# Patient Record
Sex: MALE | Race: OTHER | Employment: UNEMPLOYED | URBAN - METROPOLITAN AREA
[De-identification: names, ages, dates, MRNs, and addresses within clinical notes are randomized per-mention and may not be internally consistent; named-entity substitution may affect disease eponyms.]

---

## 2017-01-03 ENCOUNTER — OFFICE VISIT (OUTPATIENT)
Dept: BEHAVIORAL/MENTAL HEALTH CLINIC | Age: 22
End: 2017-01-03

## 2017-01-03 ENCOUNTER — TELEPHONE (OUTPATIENT)
Dept: BEHAVIORAL/MENTAL HEALTH CLINIC | Age: 22
End: 2017-01-03

## 2017-01-03 VITALS
HEART RATE: 78 BPM | OXYGEN SATURATION: 98 % | HEIGHT: 66 IN | SYSTOLIC BLOOD PRESSURE: 128 MMHG | BODY MASS INDEX: 34.39 KG/M2 | WEIGHT: 214 LBS | DIASTOLIC BLOOD PRESSURE: 82 MMHG

## 2017-01-03 DIAGNOSIS — F90.2 ATTENTION DEFICIT HYPERACTIVITY DISORDER (ADHD), COMBINED TYPE: Primary | ICD-10-CM

## 2017-01-03 DIAGNOSIS — F81.9 LEARNING DISABILITY: ICD-10-CM

## 2017-01-03 RX ORDER — LISDEXAMFETAMINE DIMESYLATE 60 MG/1
60 CAPSULE ORAL DAILY
Qty: 30 CAP | Refills: 0 | Status: SHIPPED | OUTPATIENT
Start: 2017-01-03 | End: 2017-01-03 | Stop reason: SDUPTHER

## 2017-01-03 RX ORDER — GUANFACINE 4 MG/1
4 TABLET, EXTENDED RELEASE ORAL DAILY
Qty: 30 TAB | Refills: 0 | Status: SHIPPED | OUTPATIENT
Start: 2017-01-03 | End: 2017-01-03 | Stop reason: SDUPTHER

## 2017-01-03 RX ORDER — TRAZODONE HYDROCHLORIDE 50 MG/1
TABLET ORAL
Qty: 30 TAB | Refills: 1 | Status: SHIPPED | OUTPATIENT
Start: 2017-01-03 | End: 2017-03-03 | Stop reason: SDUPTHER

## 2017-01-03 RX ORDER — LISDEXAMFETAMINE DIMESYLATE 60 MG/1
60 CAPSULE ORAL DAILY
Qty: 30 CAP | Refills: 0 | Status: SHIPPED | OUTPATIENT
Start: 2017-02-01 | End: 2017-03-03 | Stop reason: SDUPTHER

## 2017-01-03 RX ORDER — LISDEXAMFETAMINE DIMESYLATE 60 MG/1
60 CAPSULE ORAL DAILY
COMMUNITY
Start: 2016-11-23 | End: 2017-01-03 | Stop reason: SDUPTHER

## 2017-01-03 RX ORDER — GUANFACINE 4 MG/1
4 TABLET, EXTENDED RELEASE ORAL DAILY
Qty: 30 TAB | Refills: 0 | Status: SHIPPED | OUTPATIENT
Start: 2017-02-01 | End: 2017-03-03 | Stop reason: SDUPTHER

## 2017-01-03 NOTE — TELEPHONE ENCOUNTER
Spoke with pt's mother, explained that the Rx for [facility]. Informed mother that the process can take 3-5 business days and that she will be contacted when the office receives response from insurance company. No further concerns.

## 2017-01-03 NOTE — PROGRESS NOTES
Initial Psychiatric Assessment    ID: Lori Ware is a 22 y.o. yo Single  male referred by [address] [place] [address] for treatment of ADHD. Chief Complaint: \"my mouth gets out of control\"    HPI: Lori Ware is a 22 y.o. yo male who presents with symptoms of ADHD and anger. He was adopted at [address] [place] [address] after spending 5 years in the foster care system. He reports no PMH. He reports a long history of outpt psychiatric treatment for anger and ADHD starting in his childhood. He was in [facility] as a child due to his anger, and he was in a group home from [age]yo, but was kicked out after jumping up and down on the furniture. He was most recently with Dr. Adam Hoffman and therapist Hao Florence at [address] [place] [address] where he was being treated for ADHD, but he graduated out of their adolescent program and so is looking for a provider who treats adults. He and his mother, who was pulled into the session at the end for corroboration, report a history of anger and throwing and breaking items. He has never been in physical altercations. Nikita graduated high school last year, he is unemployed, and spends most of his time playing video games. His mother reports that he has broken kathy consoles in the past in order to get the latest edition. They both remark that, with therapy and the ADHD medication that he has been on since 4th grade, his anger has improved significantly. Margot Connors has a history of impulsivity, hyperactivity, being overly talkative, restlessness, distractibility, and daydreaming. He has gotten distracted at school and wondered off in the past. His mother once got a call to come and pick him up from school as he hadn't taken his medication and was jumping around on the furniture and scratching his arms until they bled. His mother reports that Margot Connors has a learning disorder and that he graduated from a special school, [email or web address], with a modified high school diploma.  He denies any recent or remote symptoms of depression or geneva such as sad moods, isolating behaviors, SI, changes to his appetite or sleep, impulsivity around gambling, spending money, or reckless driving. He denies recent or remote anxiety or panic attacks. No SI/HI. No psychosis. He is planning to enter [employer] in [place], [place] in March. He reports that this program will teach him job skills and independence. Past Psychiatric History:  Meds: Current: Vyvanse 60mg qam- helpful              Past: Concerta- hives and heart palpitations                        Geodon 60mg qhs food- helped with sleep but made him too tired and he wanted to come off                         Intuniv ER 4mg qam- helpful but he wanted to come off of his medications and so this was stopped                        Depakote- on when he was adopted and gradually got tapered off                       Risperdal- on when he was adopted and gradually got tapered off                         Melatonin- not effective   Outpt Treatment: Current: was with Dr. Maile Morales at [facility], but she just left practice and he aged out of the adolescence program. He was with Aldair Batista for therapy                                Past: started outpt psychiatry as a child and was with various providers    Past Hospitalizations: [facility] as a child for anger   Suicide attempts? no  Family hx of suicide? NO  Self injurious behaviors: denies      Past Medical History:  Aliyah Nazairo MD    Current Meds:   Current Outpatient Prescriptions   Medication Sig Dispense Refill    traZODone (DESYREL) 50 mg tablet Take 1/2 to 1 whole tablet at bedtime for insomnia. 30 Tab 1    [START ON 2/1/2017] VYVANSE 60 mg capsule Take 1 Cap by mouth daily. Max Daily Amount: 60 mg. 30 Cap 0    [START ON 2/1/2017] guanFACINE (INTUNIV) 4 mg Tb24 Take 4 mg by mouth daily. 30 Tab 0        PMH: History reviewed. No pertinent past medical history.     Family History: unknown       Social History:  Family Dynamics: Raised in [place] [address] and was put into foster care when he was 4yo and was adopted at [facility]. He is happy with his adopted family. He has several biological siblings and keeps in contact with one sister. He grew up with 2 older brothers and is close with one of them. He has a girlfriend of 1 year who lives in [place]. He just reconnected with his biological father in the summer 2016. Abuse (sexual, emotional, physical): denies   Substance Abuse:        Current: denies       Past: denies       Formal Treatment: denies   Education: graduated on time from high school, denies learning disability   Legal: denies   Catholic: Mandaeism   Living Situation: With his adopted parents and brothers live in a house next door    Employment: unemployed  Sexual:  Denies history of sexual abuse        ROS:A comprehensive review of systems was negative except for that written in the HPI. .       Vital Signs:   Visit Vitals    /82 (BP 1 Location: Left arm, BP Patient Position: Sitting)    Pulse 78    Ht 5' 6\" (1.676 m)    Wt 97.1 kg (214 lb)    SpO2 98%    BMI 34.54 kg/m2       Labs:   Results for orders placed or performed during the hospital encounter of 11/10/14   CBC WITH AUTOMATED DIFF   Result Value Ref Range    WBC 10.7 4.1 - 11.1 K/uL    RBC 5.29 4. 10 - 5.70 M/uL    HGB 15.3 12.1 - 17.0 g/dL    HCT 44.3 36.6 - 50.3 %    MCV 83.7 80.0 - 99.0 FL    MCH 28.9 26.0 - 34.0 PG    MCHC 34.5 30.0 - 36.5 g/dL    RDW 13.2 11.5 - 14.5 %    PLATELET 768 536 - 470 K/uL    NEUTROPHILS 58 32 - 75 %    LYMPHOCYTES 33 12 - 49 %    MONOCYTES 8 5 - 13 %    EOSINOPHILS 1 0 - 7 %    BASOPHILS 0 0 - 1 %    ABS. NEUTROPHILS 6.3 1.8 - 8.0 K/UL    ABS. LYMPHOCYTES 3.5 0.8 - 3.5 K/UL    ABS. MONOCYTES 0.8 0.0 - 1.0 K/UL    ABS. EOSINOPHILS 0.1 0.0 - 0.4 K/UL    ABS.  BASOPHILS 0.0 0.0 - 0.1 K/UL   METABOLIC PANEL, COMPREHENSIVE   Result Value Ref Range    Sodium 139 136 - 145 mmol/L    Potassium 4.0 3.5 - 5.1 mmol/L    Chloride 106 97 - 108 mmol/L    CO2 28 21 - 32 mmol/L    Anion gap 5 5 - 15 mmol/L    Glucose 93 65 - 100 mg/dL    BUN 10 6 - 20 MG/DL    Creatinine 0.73 0.45 - 1.15 MG/DL    BUN/Creatinine ratio 14 12 - 20      GFR est AA >60 >60 ml/min/1.73m2    GFR est non-AA >60 >60 ml/min/1.73m2    Calcium 9.1 8.5 - 10.1 MG/DL    Bilirubin, total 0.3 0.2 - 1.0 MG/DL    ALT 26 12 - 78 U/L    AST 13 (L) 15 - 37 U/L    Alk.  phosphatase 213 60 - 330 U/L    Protein, total 8.1 6.4 - 8.2 g/dL    Albumin 4.5 3.5 - 5.0 g/dL    Globulin 3.6 2.0 - 4.0 g/dL    A-G Ratio 1.3 1.1 - 2.2     TROPONIN I   Result Value Ref Range    Troponin-I, Qt. <0.04 <0.05 ng/mL   CK W/ CKMB & INDEX   Result Value Ref Range     39 - 308 U/L    CK - MB 1.4 0.5 - 3.6 NG/ML    CK-MB Index 1.2 0 - 2.5     TSH, 3RD GENERATION   Result Value Ref Range    TSH 1.38 0.36 - 3.74 uIU/mL   T4, FREE   Result Value Ref Range    T4, Free 1.0 0.8 - 1.5 NG/DL   EKG, 12 LEAD, INITIAL   Result Value Ref Range    Ventricular Rate 96 BPM    Atrial Rate 96 BPM    P-R Interval 138 ms    QRS Duration 96 ms    Q-T Interval 348 ms    QTC Calculation (Bezet) 439 ms    Calculated P Axis 54 degrees    Calculated R Axis 63 degrees    Calculated T Axis 58 degrees    Diagnosis       Normal sinus rhythm  Normal ECG    Confirmed by Zenaida Felton ([id number]) on 11/12/2014 7:44:20 AM       MSE: Mental Status exam: WNL except for    Sensorium  oriented to time, place and person   Relations cooperative and vague    Eye Contact    appropriate   Appearance:  age appropriate and casually dressed   Motor Behavior:  hyperactive, restless and gait stable   Speech:  hyperverbal, normal pitch and normal volume   Thought Process: circumstantial, goal directed and tangential   Thought Content free of delusions, free of hallucinations and not internally preoccupied    Suicidal ideations none   Homicidal ideations none   Mood:  anxious   Affect:  mood-congruent   Memory recent  adequate Memory remote:  adequate   Concentration:  fair   Abstraction:  concrete   Insight:  fair   Reliability fair   Judgment:  fair       Assessment: This is a Corie young man with an unknown genetic loading for a psychiatric disorder and no personal history of substance abuse. He reports being adopted out of the foster care system after 5 years at age 13yo. He denies an abuse history but suspected when he was in foster care system. Tamela Fernandez has limited insight and judgement, does not have a 's license, is unemployed, and has a supportive family. Diagnoses:     ICD-10-CM ICD-9-CM    1. Attention deficit hyperactivity disorder (ADHD), combined type F90.2 314.01 REFERRAL TO [facility]   2. Learning disability F81.9 315.2          Plan:  1. Meds/ Labs: Continue Vyvanse 60mg qam for focus and attention and impulsivity (2 scripts provided and dated: 1/3/17, 2/1/17).  reviewed and is unremarkable. Restart Intuniv ER 4mg qam for ADHD symptoms. Start Trazodone 25-50mg qhs for insomnia. Rxs sent to pharmacy. Discussed with both Nikita and his mother that he will need to undergo neuropsych testing for ADHD and, in order to continue receiving stimulant medication, be diagnosed with ADHD. He was referred out to Dr. Chayo Oneal for the testing. the risks and benefits of the proposed medication  the potential medication side effects  somnolence  patient given opportunity to ask questions  2. Psychotherapy: Nikita declines therapy at this time. 2. Dispo: f/u in 2 months    Risks/ Benefits/ Options of meds d/w patient and patient agrees to these medications. Patient instructed to call with any side effects.     Nancy Wall NP  1/3/2017

## 2017-01-03 NOTE — TELEPHONE ENCOUNTER
Pt's mother called and said there is information that the insurance company needs for his Vivance. Please call when you get a moment.

## 2017-01-06 ENCOUNTER — TELEPHONE (OUTPATIENT)
Dept: BEHAVIORAL/MENTAL HEALTH CLINIC | Age: 22
End: 2017-01-06

## 2017-03-03 ENCOUNTER — OFFICE VISIT (OUTPATIENT)
Dept: BEHAVIORAL/MENTAL HEALTH CLINIC | Age: 22
End: 2017-03-03

## 2017-03-03 VITALS
HEIGHT: 66 IN | SYSTOLIC BLOOD PRESSURE: 144 MMHG | BODY MASS INDEX: 32.95 KG/M2 | DIASTOLIC BLOOD PRESSURE: 90 MMHG | HEART RATE: 105 BPM | WEIGHT: 205 LBS | OXYGEN SATURATION: 98 %

## 2017-03-03 DIAGNOSIS — F90.9 ATTENTION DEFICIT HYPERACTIVITY DISORDER (ADHD), UNSPECIFIED ADHD TYPE: Primary | ICD-10-CM

## 2017-03-03 DIAGNOSIS — F81.9 LEARNING DISABILITY: ICD-10-CM

## 2017-03-03 RX ORDER — LISDEXAMFETAMINE DIMESYLATE 60 MG/1
60 CAPSULE ORAL DAILY
Qty: 30 CAP | Refills: 0 | Status: SHIPPED | OUTPATIENT
Start: 2017-04-03 | End: 2017-05-03 | Stop reason: SDUPTHER

## 2017-03-03 RX ORDER — TRAZODONE HYDROCHLORIDE 50 MG/1
TABLET ORAL
Qty: 30 TAB | Refills: 1 | Status: SHIPPED | OUTPATIENT
Start: 2017-03-03 | End: 2017-05-03 | Stop reason: SDUPTHER

## 2017-03-03 RX ORDER — GUANFACINE 4 MG/1
4 TABLET, EXTENDED RELEASE ORAL DAILY
Qty: 30 TAB | Refills: 1 | Status: SHIPPED | OUTPATIENT
Start: 2017-03-03 | End: 2017-05-03 | Stop reason: SDUPTHER

## 2017-03-03 RX ORDER — LISDEXAMFETAMINE DIMESYLATE 60 MG/1
60 CAPSULE ORAL DAILY
Qty: 30 CAP | Refills: 0 | Status: SHIPPED | OUTPATIENT
Start: 2017-03-03 | End: 2017-03-03 | Stop reason: SDUPTHER

## 2017-03-03 NOTE — PROGRESS NOTES
CHIEF COMPLAINT:  Tomas Blandon is a 22 y.o. male and was seen today for follow-up of psychiatric condition and psychotropic medication management. He attends the session with his mother. HPI:     Tomas Blandon is a 22 y.o. yo male who presents with symptoms of ADHD and anger. He was adopted at [address] [place] [address] after spending 5 years in the foster care system. He reports no PMH. He reports a long history of outpt psychiatric treatment for anger and ADHD starting in his childhood. He was in [facility] as a child due to his anger, and he was in a group home from [age]yo, but was kicked out after jumping up and down on the furniture. He was most recently with Dr. Cristel Serna and therapist Imer Sheffield at [address] [place] [address] where he was being treated for ADHD, but he graduated out of their adolescent program and so is looking for a provider who treats adults. He and his mother, who was pulled into the session at the end for corroboration, report a history of anger and throwing and breaking items. He has never been in physical altercations. Nikita graduated high school last year, he is unemployed, and spends most of his time playing video games. His mother reports that he has broken kathy consoles in the past in order to get the latest edition. They both remark that, with therapy and the ADHD medication that he has been on since 4th grade, his anger has improved significantly. Jake Antunez has a history of impulsivity, hyperactivity, being overly talkative, restlessness, distractibility, and daydreaming. He has gotten distracted at school and wondered off in the past. His mother once got a call to come and pick him up from school as he hadn't taken his medication and was jumping around on the furniture and scratching his arms until they bled. His mother reports that Jake Antunez has a learning disorder and that he graduated from a special school, [employer], with a modified high school diploma.  He denies any recent or remote symptoms of depression or geneva such as sad moods, isolating behaviors, SI, changes to his appetite or sleep, impulsivity around gambling, spending money, or reckless driving. He denies recent or remote anxiety or panic attacks. No SI/HI. No psychosis. He is planning to enter [employer] in [place], [place] in March. He reports that this program will teach him job skills and independence. FAMILY/SOCIAL HX: resides with his parents and a brother's GF and her young daughter, his brothers live next door, unemployed, graduated from [employer] with special education and a modified diploma, has a GF who resides in [facility]:  Psychiatric:  normal  Appetite:good and weight decrease by 9 lbs. Sleep: good and improved   Neuro: none reported    Visit Vitals    /90 (BP 1 Location: Left arm, BP Patient Position: Sitting)    Pulse (!) 105    Ht 5' 6\" (1.676 m)    Wt 93 kg (205 lb)    SpO2 98%    BMI 33.09 kg/m2       Side Effects:  none    MENTAL STATUS EXAM:   Sensorium  oriented to time, place and person   Relations cooperative   Appearance:  age appropriate and casually dressed   Motor Behavior:  restless, gait stable and within normal limits   Speech:  normal pitch, normal volume and non-pressured   Thought Process: goal directed and logical   Thought Content free of delusions, free of hallucinations and not internally preoccupied    Suicidal ideations none   Homicidal ideations none   Mood:  euthymic   Affect:  euthymic   Memory recent  adequate   Memory remote:  adequate   Concentration:  adequate   Abstraction:  concrete   Insight:  fair   Reliability good   Judgment:  fair     MEDICAL DECISION MAKING:  Problems addressed today:    ICD-10-CM ICD-9-CM    1. Attention deficit hyperactivity disorder (ADHD), unspecified ADHD type F90.9 314.01    2. Learning disability F81.9 315.2        Assessment:   Nikita is responding to treatment, symptoms are stable.  He reports stable ADHD symptoms with Intuniv and Vyvanse. His sleep has improved with Trazodone. He is looking forward to his week long evaluation at [facility] in [place], [place] later this month. If accepted he will live there for 12 months. No new issues. He affirms that he is eating regularly and has adequate supports. His brother's girlfriend had a baby who is almost a year old and Qian Sanchez enjoys spending time playing with her. Current Outpatient Prescriptions   Medication Sig Dispense Refill    traZODone (DESYREL) 50 mg tablet Take 1/2 to 1 whole tablet at bedtime for insomnia. 30 Tab 1    guanFACINE (INTUNIV) 4 mg Tb24 Take 4 mg by mouth daily. 30 Tab 1    [START ON 4/3/2017] VYVANSE 60 mg capsule Take 1 Cap (60 mg total) by mouth dailyEarliest Fill Date: 4/3/17. Max Daily Amount: 60 mg 30 Cap 0       Plan:   1. Medications/ Labs: Continue Vyvanse 60mg qam for focus and attention and impulsivity (2 scripts provided and dated: 3/3/17, 4/3/17). Continue Intuniv ER 4mg qam for ADHD symptoms. Continue Trazodone 25-50mg qhs for insomnia. Rxs sent to pharmacy. He has an appt with Dr. Chuy Stone for ADHD testing on 4/5/17. 2.  Counseling and coordination of care including instructions for treatment, risks/benefits, risk factor reduction and patient/family education. He agrees with the plan. Patient instructed to call with any side effects, questions or issues. 3.  Follow-up Disposition:  Return in about 2 months (around 5/3/2017).     3/3/2017  Abner Godwin NP

## 2017-04-05 ENCOUNTER — OFFICE VISIT (OUTPATIENT)
Dept: NEUROLOGY | Age: 22
End: 2017-04-05

## 2017-04-05 DIAGNOSIS — R45.4 OUTBURSTS OF ANGER: ICD-10-CM

## 2017-04-05 DIAGNOSIS — R41.840 INATTENTION: ICD-10-CM

## 2017-04-05 DIAGNOSIS — F81.9 LEARNING DISABILITIES: ICD-10-CM

## 2017-04-05 DIAGNOSIS — F43.23 ADJUSTMENT DISORDER WITH MIXED ANXIETY AND DEPRESSED MOOD: Primary | ICD-10-CM

## 2017-04-05 NOTE — PROGRESS NOTES
[address] [facility]  [address] [facility] [address] [facility]   [phone] Office   [phone] Fax      Neuropsychology    Initial Diagnostic Interview Note      Referral:  Donna Del Toro MD, Nell Alvarez NP    Elias Gill is a 22 y.o. right handed single  male who was accompanied by his adoptive mother to the initial clinical interview on 4/5/17 . Please refer to his medical records for details pertaining to his history. Briefly, the patient reported that he is in [address] [place] [address] right now ([facility]) and just finished his evaluation week. He completed high school and there was an IEP in place throughout school for LD and ADHD related concerns. He was adopted at age 6. He is on Intuniv, Trazodone, and Vyvanse. He notices benefit from medication. He has spent five years in foster care prior to being adopted. He had been treated for anger issues and ADHD issues and was in [facility] as a child due to his anger. He was in a group home from age [age], but was kicked out for behavioral issues. He has seen Dr. Aditya Conway and also a therapist (Miss Jose R Delaney) at [facility], but completed that program as an adolescent. He is not currently working. Distracted in school, impulsivity, hyperactivity, being overly talkative, restlessness, distractibility, and daydreaming. He has gotten distracted at school and wondered off in the past. His mother once got a call to come and pick him up from school as he hadn't taken his medication and was jumping around on the furniture and scratching his arms until they bled. His mother reports that Martina Donovan has a learning disorder and that he graduated from a special school, [employer], with a modified high school diploma. He is looking to gain job skills and independence. Mother says he would need help with financial management, but would be able to do some independent living. He does not cook. He wanted to take classes in cooking at [employer]. He can use microwave. He is interested in learning how to drive. MMSE: 27/30 with spelling 3/5 and recall 2/3    Clock: normal    TOPF:  25    He has not had neuropsych testing as an adult. Historian: Good  Praxis: No UE apraxia  R/L Orientation: Intact to self and to other  Dress: within normal limits   Weight: within normal limits   Appearance/Hygiene: within normal limits   Gait: within normal limits   Assistive Devices: None  Mood: within normal limits   Affect: within normal limits   Comprehension: within normal limits   Thought Process: within normal limits   Expressive Language: within normal limits   Receptive Language: within normal limits   Motor:  No cognitive or motor perseveration  ETOH: Denied  Tobacco: Denied  Illicit: Denied  SI/HI: Denied  Psychosis: Denied  Insight: Within normal limits  Judgment: Within normal limits  Other Psych:      History reviewed. No pertinent past medical history. History reviewed. No pertinent surgical history. Allergies   Allergen Reactions    Concerta [Methylphenidate] Hives    Pcn [Penicillins] Other (comments)     Unknown. Patient is adopted and allergy was listed in record       Family History   Problem Relation Age of Onset    Other Other      adopted, but biological mother  age 36 unknown cause       Social History   Substance Use Topics    Smoking status: Never Smoker    Smokeless tobacco: Never Used    Alcohol use No       Current Outpatient Prescriptions   Medication Sig Dispense Refill    traZODone (DESYREL) 50 mg tablet Take 1/2 to 1 whole tablet at bedtime for insomnia. 30 Tab 1    guanFACINE (INTUNIV) 4 mg Tb24 Take 4 mg by mouth daily. 30 Tab 1    VYVANSE 60 mg capsule Take 1 Cap (60 mg total) by mouth dailyEarliest Fill Date: 4/3/17. Max Daily Amount: 60 mg 30 Cap 0         Plan:  Obtain authorization for testing from [employer].   Report to follow once testing, scoring, and interpretation completed. ? Organic based neurocognitive issues versus mood disorder or combination of same. ? Problems organic, functional, or both? This note will not be viewable in [address] Needs testing as an adult for medications to continue.   Clarify diagnoses ADHD, ODD, angry outbursts, etc.

## 2017-04-11 ENCOUNTER — OFFICE VISIT (OUTPATIENT)
Dept: NEUROLOGY | Age: 22
End: 2017-04-11

## 2017-04-11 DIAGNOSIS — F90.2 ATTENTION DEFICIT HYPERACTIVITY DISORDER (ADHD), COMBINED TYPE, MODERATE: ICD-10-CM

## 2017-04-11 DIAGNOSIS — Z62.822 BEHAVIOR CAUSING CONCERN IN FOSTER CHILD: ICD-10-CM

## 2017-04-11 DIAGNOSIS — Z86.59 HISTORY OF CONDUCT DISORDER: ICD-10-CM

## 2017-04-11 DIAGNOSIS — F81.9 LEARNING DISABILITIES: ICD-10-CM

## 2017-04-11 DIAGNOSIS — F32.A DEPRESSION WITH SOMATIZATION: Primary | ICD-10-CM

## 2017-04-11 DIAGNOSIS — F45.0 DEPRESSION WITH SOMATIZATION: Primary | ICD-10-CM

## 2017-04-11 DIAGNOSIS — F41.8 ANXIETY WITH SOMATIC FEATURES: ICD-10-CM

## 2017-04-17 NOTE — PROGRESS NOTES
[address] [facility]  [address] [facility]   [address] [facility]   Bobbi Denson    [phone] Office   [phone] Fax      Psychological Evaluation REprot    Referral:  Cristhian Beltrán MD, Jacqueline Escobar NP    Kari Lagos is a 22 y.o. right handed single  male who was accompanied by his adoptive mother to the initial clinical interview on 4/5/17 . Please refer to his medical records for details pertaining to his history. Briefly, the patient reported that he is in [address] [place] [address] right now ([facility]) and just finished his evaluation week. He completed high school and there was an IEP in place throughout school for LD and ADHD related concerns. He was adopted at age 6. He is on Intuniv, Trazodone, and Vyvanse. He notices benefit from medication. He has spent five years in foster care prior to being adopted. He had been treated for anger issues and ADHD issues and was in [facility] as a child due to his anger. He was in a group home from age [age], but was kicked out for behavioral issues. He has seen Dr. Vinay Jiménez and also a therapist (Miss Severino Hines) at [facility], but completed that program as an adolescent. He is not currently working. Distracted in school, impulsivity, hyperactivity, being overly talkative, restlessness, distractibility, and daydreaming. He has gotten distracted at school and wondered off in the past. His mother once got a call to come and pick him up from school as he hadn't taken his medication and was jumping around on the furniture and scratching his arms until they bled. His mother reports that Sandra Rivers has a learning disorder and that he graduated from a special school, [email or web address], with a modified high school diploma. He is looking to gain job skills and independence. Mother says he would need help with financial management, but would be able to do some independent living. He does not cook.   He wanted to take classes in cooking at [employer]. He can use microwave. He is interested in learning how to drive. MMSE: 27/30 with spelling 3/5 and recall 2/3    Clock: normal    TOPF:  25    He has not had neuropsych testing as an adult. Historian: Good  Praxis: No UE apraxia  R/L Orientation: Intact to self and to other  Dress: within normal limits   Weight: within normal limits   Appearance/Hygiene: within normal limits   Gait: within normal limits   Assistive Devices: None  Mood: within normal limits   Affect: within normal limits   Comprehension: within normal limits   Thought Process: within normal limits   Expressive Language: within normal limits   Receptive Language: within normal limits   Motor:  No cognitive or motor perseveration  ETOH: Denied  Tobacco: Denied  Illicit: Denied  SI/HI: Denied  Psychosis: Denied  Insight: Within normal limits  Judgment: Within normal limits  Other Psych:      History reviewed. No pertinent past medical history. History reviewed. No pertinent surgical history. Allergies   Allergen Reactions    Concerta [Methylphenidate] Hives    Pcn [Penicillins] Other (comments)     Unknown. Patient is adopted and allergy was listed in record       Family History   Problem Relation Age of Onset    Other Other      adopted, but biological mother  age 36 unknown cause       Social History   Substance Use Topics    Smoking status: Never Smoker    Smokeless tobacco: Never Used    Alcohol use No       Current Outpatient Prescriptions   Medication Sig Dispense Refill    traZODone (DESYREL) 50 mg tablet Take 1/2 to 1 whole tablet at bedtime for insomnia. 30 Tab 1    guanFACINE (INTUNIV) 4 mg Tb24 Take 4 mg by mouth daily. 30 Tab 1    VYVANSE 60 mg capsule Take 1 Cap (60 mg total) by mouth dailyEarliest Fill Date: 4/3/17. Max Daily Amount: 60 mg 30 Cap 0         Plan:  Obtain authorization for testing from [employer].   Report to follow once testing, scoring, and interpretation completed. ? Organic based neurocognitive issues versus mood disorder or combination of same. ? Problems organic, functional, or both? This note will not be viewable in [address] Needs testing as an adult for medications to continue. Clarify diagnoses ADHD, ODD, angry outbursts, etc.     Psychological Evaluation  Patient Testing 4/11/17 Report Completed 4/17/17  A Psychometrist Assisted w/ portions of this evaluation while under my direct  supervision    Neuropsychologist Administered, Interpreted, & Reported: Neuropsychological Mental Status Exam, Revised Memory & Behavior Checklist, MMSE, Clock Drawing, Test Of Premorbid Functioning, Bari Marc Adult ADD Scales, Sukh-Melzack Pain Questionnaire, History Taking  & Clinical Interview With The Patient*, Additional History Taking w/ The Patient's Adoptive Mother, Review Of Available Records*. Psychometrist Administered & Neuropsychologist Interpreted & Neuropsychologist Reported: , Wechsler Adult Intelligence Scale - IV, Verbal Fluency Tests, Anthony & Anthony - Revised, Trailmaking Test Parts A & B, Buschke Selective Reminding Test, Godfrey Depression Inventory - II, Godfrey Anxiety Inventory, Personality Assessment Inventory, Sang Continuous Performance Test - III, ABAS-3. Test Findings:  Note:  The patients raw data have been compared with currently available norms which include demographic corrections for age, gender, and/or education. Sometimes, the patients scores are compared to demographically similar individuals as close to the patients age, education level, etc., as possible. \"Average\" is viewed as being +/- 1 standard deviation (SD) from the stated mean for a particular test score. \"Low average\" is viewed as being between 1 and 2 SD below the mean, and above average is viewed as being 1 and 2 SD above the mean.   Scores falling in the borderline range (between 1-1/2 and 2 SD below the mean) are viewed with particular attention as to whether they are normal or abnormal neurocognitive test scores. Other methods of inference in analyzing the test data are also utilized, including the pattern and range of scores in the profile, bilateral motor functions, and the presence, if any, of pathognomonic signs. Behaviorally, the patient was friendly and cooperative and appeared motivated to perform well during this examination. Within this context, the results of this evaluation are viewed as a valid reflection of the patients actual neurocognitive and emotional status. The patient's self-reported score of 77 on the [facility] Adult ADD Scales was within the elevated risk range for ADHD related concerns. His structured word list fluency, as assessed by the FAS Test, was within the moderately impaired range with a T score of 28. Category fluency was within the below average range with a T Score of 41. Confrontation naming, as assessed by the [facility] - Revised, was within the below average range at 49/60 correct (T = 41). This pattern of performance is indicative of a patient who is at increased risk for day-to-day problems with verbal fluency and confrontation naming was normal     The patient was administered the [facility] Continuous Performance Test-III and review of the subscales within this instrument revealed mild concern for inattentiveness without concern for impulsivity. This pattern of performance is indicative of a patient who is at increased risk for day-to-day problems with sustained visual attention. The patient was administered the Wechsler Adult Intelligence Scale - IV. See Appendix I (in media section of this EMR) for full breakdown of IQ scores. There was no clinically significant difference between his low average range Working Memory Index score of 80 (9th %ile) and his borderline range Processing Speed Index score of 79 (8th %ile).   His Verbal Comprehension Index score of 91 was within the average range. His Perceptual Reasoning Index score of 96 was also average. This pattern of performance is indicative of a patient who is at increased risk for day-to-day problems with processing speed, which is lower than expected based on his performance on a task assessing premorbid functioning levels. The patient was administered the Buschke Selective Reminding Test and his basic learning and memory (126/144) was normal.  In this regard, his consistency related long term retrieval was impaired (46/144 correct), especially when compared to his normal range [employer] (101/133). His discrepancy score (+55 points) is clinically significant and is suggestive of a high level cognitive organization problem and/or high level attention concern. Simple timed visual motor sequencing (Trailmaking Test Part A) was within the mildly impaired range with a T score of 37 (1 error). The patient's performance on a similar, but more complex task of timed visual motor sequencing (Trailmaking Test Part B) was within the average range with a T score of 47. The patient made only one  sequencing error on this latter test.  Taken together, this pattern of performance is not indicative of a patient who is at increased risk for day-to-day problems with frontal lobe-mediated executive functioning abilities. The patient rated his current level of pain as \"0/5  - No Pain\" on the Sukh-Melzack Pain Questionnaire. His Godfrey Depression Inventory - II score of 5 was within the minimally depressed range. His Godfrey Anxiety Inventory score of 7 reflected minimal anxiety. The patient was also administered the Personality Assessment Inventory and review of the validity scales revealed considerable defensiveness in responding.   Despite this defensiveness, he is reporting problems with mistrust, suspicion, low frustration tolerance, PTSD type symptoms, history of antisocial behavior, poor sense of identity, physical signs of depression and anxiety, and frequent routine physical complaints. He closely monitors his environment for evidence that others are trying to discredit him in some what. There are some elements of elevated mood. Self-concept is positive. He has an exceptionally strong need to be accepted by others. He describes his temper as normal and well controlled without difficulty. His level of treatment motivation is very low. The adoptive mother completed the Adaptive Behavior Assessment System -3 and reported marked concerns for the patient's general adaptive skills (0.1st %ile), conceptual sklils (0.4th %ile), social skills (1st %ile) and practical skills (<0.1st %ile). Impressions and Recommendations:  Results from the current exam are consistent with previous evaluations he had as an adolescent. This is an individual with a history of chronic learning disabilities as well as moderate to severe ADHD type concerns who was in and out of the foster care system when younger and who had numerous behavioral problems and general mood issues. He appears to have benefited well from treatment for the antisocial type behaviors and general mood concerns and continues to have marked difficulties with ADHD issues. He is defensive in terms of response style on personality testing so I cannot definitely assess his psychiatric status but there are elements of anxiety, depression, and PTSD here. I believe him psychologically stable enough to attend programming at [facility] and agree with their vocational training plan and the plan to teach him day-to-day independent living skills. I also believe that medication for mood and for attention continue to be warranted. My understanding is that the primary purpose of this evaluation was to clarify his neurocognitive status as an adult and that has been done.   This report remains consistent with previous diagnoses, with the most major improvement being in the mood/psychiatric domain. I remain wiling and able to provide additional recommendations as needed, and wish him the best in his future endeavors. We now have baseline neurocognitive data on him as an adult. Follow up prn. Clinical correlation is, of course, indicated. DIAGNOSES: ADHD- Combined Type - Moderate To Severe    Depression w/ Somatization    Anxiety w/ Somatization    Learning Disabilities, By History    Rule Out PTSD    History of Conduct Disorder    History of Other Antisocial Behaviors       The above information is based upon information currently available to me. If there is any additional information of which I am currently unaware, I would be more than happy to review it upon having it made available to me. Thank you for the opportunity to see this interesting individual.     Sincerely,       Efrain Up. Eva Isbell, EdS,LCP    Appendix: IQ Test results (see media section of this EMR)    Cc: Jarad Young MD         2 units -02767- Record review. Review of history provided by patient. Review of collaborative information. Testing by Clinician. Review of raw data. Scoring. Report writing of individual tests administered by Clinician. Integration of individual tests administered by psychometrist (that were previously reported and billed under psychometry code below) with testing by clinician and review of records/history/collaborative information. Case Conceptualization, Report writing. Coordination Of Care. 2 units  -45781 - Psychometrist test prep, administration, and scoring under clinician's direct supervision. Clinical interpretation of individual tests administered by psychometrist .  Clinician report of individual tests administered by psychometrist.    \"Unit\" is defined by CPT/National Guidelines (31 - 60 minutes).   Integral services including scoring of raw data, data interpretation, case conceptualization, report writing etcetera were initiated after the patient finished testing/raw data collected and was completed on the date the report was signed.

## 2017-05-03 ENCOUNTER — OFFICE VISIT (OUTPATIENT)
Dept: BEHAVIORAL/MENTAL HEALTH CLINIC | Age: 22
End: 2017-05-03

## 2017-05-03 VITALS
BODY MASS INDEX: 31.98 KG/M2 | SYSTOLIC BLOOD PRESSURE: 140 MMHG | DIASTOLIC BLOOD PRESSURE: 73 MMHG | HEART RATE: 97 BPM | HEIGHT: 66 IN | OXYGEN SATURATION: 98 % | WEIGHT: 199 LBS

## 2017-05-03 DIAGNOSIS — F81.9 LEARNING DISABILITY: ICD-10-CM

## 2017-05-03 DIAGNOSIS — F90.9 ATTENTION DEFICIT HYPERACTIVITY DISORDER (ADHD), UNSPECIFIED ADHD TYPE: Primary | ICD-10-CM

## 2017-05-03 RX ORDER — LISDEXAMFETAMINE DIMESYLATE 60 MG/1
60 CAPSULE ORAL DAILY
Qty: 30 CAP | Refills: 0 | Status: SHIPPED | OUTPATIENT
Start: 2017-06-02 | End: 2017-05-03 | Stop reason: SDUPTHER

## 2017-05-03 RX ORDER — LISDEXAMFETAMINE DIMESYLATE 60 MG/1
60 CAPSULE ORAL DAILY
Qty: 30 CAP | Refills: 0 | Status: SHIPPED | OUTPATIENT
Start: 2017-05-03 | End: 2017-05-03 | Stop reason: SDUPTHER

## 2017-05-03 RX ORDER — TRAZODONE HYDROCHLORIDE 50 MG/1
TABLET ORAL
Qty: 30 TAB | Refills: 2 | Status: SHIPPED | OUTPATIENT
Start: 2017-05-03 | End: 2017-07-05 | Stop reason: SDUPTHER

## 2017-05-03 RX ORDER — LISDEXAMFETAMINE DIMESYLATE 60 MG/1
60 CAPSULE ORAL DAILY
Qty: 30 CAP | Refills: 0 | Status: SHIPPED | OUTPATIENT
Start: 2017-07-02 | End: 2017-07-05 | Stop reason: SDUPTHER

## 2017-05-03 RX ORDER — GUANFACINE 4 MG/1
4 TABLET, EXTENDED RELEASE ORAL DAILY
Qty: 30 TAB | Refills: 2 | Status: SHIPPED | OUTPATIENT
Start: 2017-05-03 | End: 2017-07-05 | Stop reason: SDUPTHER

## 2017-05-03 NOTE — PROGRESS NOTES
CHIEF COMPLAINT:  Phylicia King is a 22 y.o. male and was seen today for follow-up of psychiatric condition and psychotropic medication management. He attends the session with his mother. HPI:    Phylicia King is a 22 y.o. yo male who presents with symptoms of ADHD and anger. He was adopted at [address] [place] [address] after spending 5 years in the foster care system. He reports no PMH. He reports a long history of outpt psychiatric treatment for anger and ADHD starting in his childhood. He was in [facility] as a child due to his anger, and he was in a group home from [age]yo, but was kicked out after jumping up and down on the furniture. He was most recently with Dr. Chava Howell and therapist Mortimer Mares at [address] [place] [address] where he was being treated for ADHD, but he graduated out of their adolescent program and so is looking for a provider who treats adults. He and his mother, who was pulled into the session at the end for corroboration, report a history of anger and throwing and breaking items. He has never been in physical altercations. Nikita graduated high school last year, he is unemployed, and spends most of his time playing video games. His mother reports that he has broken kathy consoles in the past in order to get the latest edition. They both remark that, with therapy and the ADHD medication that he has been on since 4th grade, his anger has improved significantly. Wicho Vaz has a history of impulsivity, hyperactivity, being overly talkative, restlessness, distractibility, and daydreaming. He has gotten distracted at school and wondered off in the past. His mother once got a call to come and pick him up from school as he hadn't taken his medication and was jumping around on the furniture and scratching his arms until they bled. His mother reports that Wicho Vaz has a learning disorder and that he graduated from a special school, [employer], with a modified high school diploma.  He denies any recent or remote symptoms of depression or geneva such as sad moods, isolating behaviors, SI, changes to his appetite or sleep, impulsivity around gambling, spending money, or reckless driving. He denies recent or remote anxiety or panic attacks. No SI/HI. No psychosis. He is planning to enter [employer] in [place], [place] in March. He reports that this program will teach him job skills and independence. FAMILY/SOCIAL HX: resides with his parents and a brother's GF and her young daughter, his brothers live next door, unemployed, graduated from [employer] high school with special education and a modified diploma, has a GF who resides in [place]:  Psychiatric:  normal  Appetite:good and weight decrease by 6 lbs. Sleep: good   Neuro: none reported    Visit Vitals    /73 (BP 1 Location: Left arm, BP Patient Position: Sitting)    Pulse 97    Ht 5' 6\" (1.676 m)    Wt 90.3 kg (199 lb)    SpO2 98%    BMI 32.12 kg/m2       Side Effects:  none    MENTAL STATUS EXAM:   Sensorium  oriented to time, place and person   Relations cooperative   Appearance:  age appropriate and casually dressed   Motor Behavior:  restless and within normal limits   Speech:  normal pitch, normal volume and non-pressured   Thought Process: goal directed and logical   Thought Content free of delusions, free of hallucinations and not internally preoccupied    Suicidal ideations none   Homicidal ideations none   Mood:  euthymic   Affect:  euthymic   Memory recent  adequate   Memory remote:  adequate   Concentration:  adequate   Abstraction:  concrete   Insight:  fair   Reliability fair   Judgment:  fair     MEDICAL DECISION MAKING:  Problems addressed today:    ICD-10-CM ICD-9-CM    1. Attention deficit hyperactivity disorder (ADHD), unspecified ADHD type F90.9 314.01    2. Learning disability F81.9 315.2        Assessment:   Nikita is responding to treatment, symptoms are stable.  He is doing well and is looking forward to starting at [employer] in [place], [place] later this month. Both Nikita and his mother report that he will have nurses and a psychiatrist on staff. His mother plans to discuss logistics of how he gets his medication filled and renewed with the , [facility]. Nikita underwent a neuropsych eval by Dr. Leanna Weiss, which indicates an ADHD diagnosis. Nikita reports euthymic moods and stable sleep. He is still enjoying spending time with his young niece and his family. Current Outpatient Prescriptions   Medication Sig Dispense Refill    traZODone (DESYREL) 50 mg tablet Take 1/2 to 1 whole tablet at bedtime for insomnia. 30 Tab 2    guanFACINE ER (INTUNIV) 4 mg Tb24 ER tablet Take 1 Tab by mouth daily. 30 Tab 2    [START ON 7/2/2017] VYVANSE 60 mg capsule Take 1 Cap (60 mg total) by mouth dailyEarliest Fill Date: 7/2/17. Max Daily Amount: 60 mg 30 Cap 0       Plan:   1. Medications/ Labs: Continue Vyvanse 60mg qam for focus and attention and impulsivity (3 scripts provided and dated: 5/3/17, 6/2/17, and 7/2/17). Continue Intuniv ER 4mg qam for ADHD symptoms. Continue Trazodone 25-50mg qhs for insomnia. Rxs sent to pharmacy. 2.  Counseling and coordination of care including instructions for treatment, risks/benefits, risk factor reduction and patient/family education. He agrees with the plan. Patient instructed to call with any side effects, questions or issues.      3.  Follow-up Disposition: Not on File    5/3/2017  Cl Bloom NP

## 2017-07-05 ENCOUNTER — OFFICE VISIT (OUTPATIENT)
Dept: BEHAVIORAL/MENTAL HEALTH CLINIC | Age: 22
End: 2017-07-05

## 2017-07-05 VITALS
HEIGHT: 66 IN | HEART RATE: 100 BPM | OXYGEN SATURATION: 98 % | SYSTOLIC BLOOD PRESSURE: 147 MMHG | WEIGHT: 189 LBS | DIASTOLIC BLOOD PRESSURE: 70 MMHG | BODY MASS INDEX: 30.37 KG/M2

## 2017-07-05 DIAGNOSIS — F81.9 LEARNING DISABILITY: ICD-10-CM

## 2017-07-05 DIAGNOSIS — F90.9 ATTENTION DEFICIT HYPERACTIVITY DISORDER (ADHD), UNSPECIFIED ADHD TYPE: Primary | ICD-10-CM

## 2017-07-05 RX ORDER — LISDEXAMFETAMINE DIMESYLATE 60 MG/1
60 CAPSULE ORAL DAILY
Qty: 30 CAP | Refills: 0 | Status: SHIPPED | OUTPATIENT
Start: 2017-09-02 | End: 2017-10-03 | Stop reason: SDUPTHER

## 2017-07-05 RX ORDER — TRAZODONE HYDROCHLORIDE 50 MG/1
TABLET ORAL
Qty: 30 TAB | Refills: 2 | Status: SHIPPED | OUTPATIENT
Start: 2017-07-05 | End: 2017-10-03 | Stop reason: SDUPTHER

## 2017-07-05 RX ORDER — LISDEXAMFETAMINE DIMESYLATE 60 MG/1
60 CAPSULE ORAL DAILY
Qty: 30 CAP | Refills: 0 | Status: SHIPPED | OUTPATIENT
Start: 2017-08-02 | End: 2017-07-05 | Stop reason: SDUPTHER

## 2017-07-05 RX ORDER — GUANFACINE 4 MG/1
4 TABLET, EXTENDED RELEASE ORAL DAILY
Qty: 30 TAB | Refills: 2 | Status: SHIPPED | OUTPATIENT
Start: 2017-07-05 | End: 2017-10-03 | Stop reason: SDUPTHER

## 2017-07-05 NOTE — PROGRESS NOTES
CHIEF COMPLAINT:  Harvel Ormond is a 22 y.o. male and was seen today for follow-up of psychiatric condition and psychotropic medication management. He attends the session with his mother. HPI:    Harvel Ormond is a 22 y.o. yo male who presents with symptoms of ADHD and anger. He was adopted at [address] [place] [address] after spending 5 years in the foster care system. He reports no PMH. He reports a long history of outpt psychiatric treatment for anger and ADHD starting in his childhood. He was in [facility] as a child due to his anger, and he was in a group home from [age]yo, but was kicked out after jumping up and down on the furniture. He was most recently with Dr. Supriya Luis and therapist Dianna Lomeli at [facility] where he was being treated for ADHD, but he graduated out of their adolescent program and so is looking for a provider who treats adults. Hi binuther report a history of anger and throwing and breaking items. He has never been in physical altercations. Nikita reportedly has a learning disorder and graduated from a special school, [employer], with a modified high school diploma. He spends a large amount of his time playing video games. His mother reports that he has broken kathy consoles in the past in order to get the latest edition. They both remark that, with therapy and the ADHD medication that he has been on since 4th grade, his anger has improved significantly. Attila Andujar has a history of impulsivity, hyperactivity, being overly talkative, restlessness, distractibility, and daydreaming. He has gotten distracted at school and wondered off in the past. His mother once got a call to come and pick him up from school as he hadn't taken his medication and was jumping around on the furniture and scratching his arms until they bled. He denies depression and SI/HI. He denies recent or remote anxiety or panic attacks. No psychosis. He reports that this program will teach him job skills and independence. FAMILY/SOCIAL HX: resides with his parents and a brother's GF and her young daughter, his brothers live next door, unemployed, graduated from [employer] with special education and a modified diploma, has a GF who resides in [facility]:  Psychiatric:  normal  Appetite:good and weight decrease by 10 lbs. Sleep: good   Neuro: none reported    Visit Vitals    /70 (BP 1 Location: Left arm, BP Patient Position: Sitting)    Pulse 100    Ht 5' 6\" (1.676 m)    Wt 85.7 kg (189 lb)    SpO2 98%    BMI 30.51 kg/m2       Side Effects:  none    MENTAL STATUS EXAM:   Sensorium  oriented to time, place and person   Relations cooperative   Appearance:  age appropriate and casually dressed   Motor Behavior:  gait stable and within normal limits   Speech:  normal pitch, normal volume and non-pressured   Thought Process: goal directed and logical   Thought Content free of delusions, free of hallucinations and not internally preoccupied    Suicidal ideations none   Homicidal ideations none   Mood:  euthymic   Affect:  euthymic   Memory recent  adequate   Memory remote:  adequate   Concentration:  adequate   Abstraction:  concrete   Insight:  fair   Reliability fair   Judgment:  fair     MEDICAL DECISION MAKING:  Problems addressed today:    ICD-10-CM ICD-9-CM    1. Attention deficit hyperactivity disorder (ADHD), unspecified ADHD type F90.9 314.01    2. Learning disability F81.9 315.2        Assessment:   Nikita is responding to treatment, symptoms are stable. He has been cutting out junk food and has lost 10 lbs. He leaves for [employer] in [place] on August 7th and is looking forward to this. He is enjoying spending time with his 13mo old niece. He was out of the Intuniv x 2 weeks and notes that he feels more depressed. A prior Neil Fleischer was just approved by his insurance co. His mother plans to sign a release for us to be able to talk with Tamika Girish in [place].        Current Outpatient Prescriptions   Medication Sig Dispense Refill    [START ON 9/2/2017] VYVANSE 60 mg capsule Take 1 Cap (60 mg total) by mouth dailyEarliest Fill Date: 9/2/17. Max Daily Amount: 60 mg 30 Cap 0    traZODone (DESYREL) 50 mg tablet Take 1/2 to 1 whole tablet at bedtime for insomnia. 30 Tab 2    guanFACINE ER (INTUNIV) 4 mg Tb24 ER tablet Take 1 Tab by mouth daily. 30 Tab 2       Plan:   1. Medications/ Labs: Continue Vyvanse 60mg qam for focus and attention and impulsivity (he will have July rx filled this week and he was provided with a rx dated 8/2 and a rx dated 9/2). Continue Intuniv ER 4mg qam for ADHD symptoms. Continue Trazodone 25-50mg qhs for insomnia. Rxs sent to pharmacy. 2.  Counseling and coordination of care including instructions for treatment, risks/benefits, risk factor reduction and patient/family education. He agrees with the plan. Patient instructed to call with any side effects, questions or issues.      3.  Follow-up Disposition:  Return in about 3 months (around 10/5/2017).    7/5/2017  Kj Moe NP

## 2017-07-05 NOTE — MR AVS SNAPSHOT
Visit Information Date & Time Provider Department Dept. Phone Encounter #  
 7/5/2017  9:30 AM Cornell Hollis NP [facility] [phone] Follow-up Instructions Return in about 3 months (around 10/5/2017). Follow-up and Disposition History Upcoming Health Maintenance Date Due  
 HPV AGE 9Y-34Y (1 of 3 - Male 3 Dose Series) 10/15/2007 Influenza Age 5 to Adult 8/1/2017 DTaP/Tdap/Td series (1 - Tdap) 10/15/2017 Allergies as of 7/5/2017  Review Complete On: 7/5/2017 By: Cornell Hollis NP Severity Noted Reaction Type Reactions Concerta [Methylphenidate]  01/03/2017    Hives Pcn [Penicillins]  11/10/2014    Other (comments) Unknown. Patient is adopted and allergy was listed in record Current Immunizations  Never Reviewed No immunizations on file. Not reviewed this visit You Were Diagnosed With   
  
 Codes Comments Attention deficit hyperactivity disorder (ADHD), unspecified ADHD type    -  Primary ICD-10-CM: F90.9 ICD-9-CM: 314.01 Learning disability     ICD-10-CM: F81.9 ICD-9-CM: 315.2 Vitals BP Pulse Height(growth percentile) Weight(growth percentile) SpO2 BMI  
 147/70 (BP 1 Location: Left arm, BP Patient Position: Sitting) 100 5' 6\" (1.676 m) 189 lb (85.7 kg) 98% 30.51 kg/m2 Smoking Status Never Smoker Vitals History BMI and BSA Data Body Mass Index Body Surface Area 30.51 kg/m 2 2 m 2 Preferred Pharmacy Pharmacy Name Phone [id number] [phone] Your Updated Medication List  
  
   
This list is accurate as of: 7/5/17 11:59 PM.  Always use your most recent med list.  
  
  
  
  
 guanFACINE ER 4 mg Tb24 ER tablet Commonly known as: Intuniv Take 1 Tab by mouth daily. traZODone 50 mg tablet Commonly known as:  Ryan Pierce  
 Take 1/2 to 1 whole tablet at bedtime for insomnia. VYVANSE 60 mg capsule Generic drug:  Lisdexamfetamine Take 1 Cap (60 mg total) by mouth dailyEarliest Fill Date: 9/2/17. Max Daily Amount: 60 mg  
  
  
  
  
Prescriptions Printed Refills VYVANSE 60 mg capsule (Discontinued) 0 Sig: Take 1 Cap (60 mg total) by mouth dailyEarliest Fill Date: 9/2/17. Max Daily Amount: 60 mg  
 Class: Print Route: Oral  
 Reason for Discontinue: Reorder Prescriptions Sent to Pharmacy Refills  
 traZODone (DESYREL) 50 mg tablet (Discontinued) 2 Sig: Take 1/2 to 1 whole tablet at bedtime for insomnia. Class: Normal  
 Pharmacy: [address] #: [phone] Reason for Discontinue: Reorder  
 guanFACINE ER (INTUNIV) 4 mg Tb24 ER tablet (Discontinued) 2 Sig: Take 1 Tab by mouth daily. Class: Normal  
 Pharmacy: [address] #: [phone] Route: Oral  
 Reason for Discontinue: Reorder Follow-up Instructions Return in about 3 months (around 10/5/2017). Introducing [facility]! Tete Velazquez introduces Skin Scan patient portal. Now you can access parts of your medical record, email your doctor's office, and request medication refills online. 1. In your internet browser, go to [email or web address] 2. Click on the First Time User? Click Here link in the Sign In box. You will see the New Member Sign Up page. 3. Enter your Skin Scan Access Code exactly as it appears below. You will not need to use this code after youve completed the sign-up process. If you do not sign up before the expiration date, you must request a new code. · [employer] Access Code: [email or web address] Expires: 2/12/2018  9:12 AM 
 
4. Enter the last four digits of your Social Security Number (xxxx) and Date of Birth (mm/dd/yyyy) as indicated and click Submit.  You will be taken to the next sign-up page. 5. Create a Wuiper ID. This will be your Wuiper login ID and cannot be changed, so think of one that is secure and easy to remember. 6. Create a Wuiper password. You can change your password at any time. 7. Enter your Password Reset Question and Answer. This can be used at a later time if you forget your password. 8. Enter your e-mail address. You will receive e-mail notification when new information is available in [address] 9. Click Sign Up. You can now view and download portions of your medical record. 10. Click the Download Summary menu link to download a portable copy of your medical information. If you have questions, please visit the Frequently Asked Questions section of the Wuiper website. Remember, Wuiper is NOT to be used for urgent needs. For medical emergencies, dial 911. Now available from your iPhone and Android! Please provide this summary of care documentation to your next provider. Your primary care clinician is listed as Kiara Zapata. If you have any questions after today's visit, please call [phone].

## 2017-10-02 ENCOUNTER — TELEPHONE (OUTPATIENT)
Dept: BEHAVIORAL/MENTAL HEALTH CLINIC | Age: 22
End: 2017-10-02

## 2017-10-02 NOTE — TELEPHONE ENCOUNTER
Pt's mother called and stated that her son is at [facility] and they were not able to receive his medication script by fax; she was wondering if there was another option. Please call her back at  when you have a moment.

## 2017-10-03 RX ORDER — LISDEXAMFETAMINE DIMESYLATE 60 MG/1
60 CAPSULE ORAL DAILY
Qty: 30 CAP | Refills: 0 | Status: SHIPPED | OUTPATIENT
Start: 2017-10-03 | End: 2017-11-20 | Stop reason: SDUPTHER

## 2017-10-03 RX ORDER — GUANFACINE 4 MG/1
4 TABLET, EXTENDED RELEASE ORAL DAILY
Qty: 30 TAB | Refills: 1 | Status: SHIPPED | OUTPATIENT
Start: 2017-10-03 | End: 2017-11-20 | Stop reason: SDUPTHER

## 2017-10-03 RX ORDER — TRAZODONE HYDROCHLORIDE 50 MG/1
TABLET ORAL
Qty: 30 TAB | Refills: 1 | Status: SHIPPED | OUTPATIENT
Start: 2017-10-03 | End: 2017-11-20 | Stop reason: SDUPTHER

## 2017-10-03 NOTE — TELEPHONE ENCOUNTER
Returned his mother's call. She reports that his [employer] worker, Radha, quit and he does not have services established in [place] and is now out of his medication. He is doing well there. He'll be back in November for Thanksgiving. Agreed to provide 1 month of Vyvanse and enough Intuniv and Trazodone to cover him until he is home. Nikita and his mother had orginially informed us that plan was for him to f/u with a provider close to [place]-? His mother is now unsure of the plan for this. She will come  his scripts. She will drop off the contact name and number for his current [employer] worker.

## 2017-10-04 ENCOUNTER — DOCUMENTATION ONLY (OUTPATIENT)
Dept: BEHAVIORAL/MENTAL HEALTH CLINIC | Age: 22
End: 2017-10-04

## 2017-10-04 NOTE — PROGRESS NOTES
Spoke with Lakeisha Hayawrd, rehab counselor at [facility]. She will have Nikita start seeing the program psychiatrist. She was informed that he has 1 more month of Vyvanse and several more months of Guanfacine and Trazodone. She affirmed understanding.

## 2017-11-10 ENCOUNTER — TELEPHONE (OUTPATIENT)
Dept: BEHAVIORAL/MENTAL HEALTH CLINIC | Age: 22
End: 2017-11-10

## 2017-11-10 NOTE — TELEPHONE ENCOUNTER
Returned pt's mother call. Pt's mother stated that the pt has a new insurance and she would like to make an appt for pt. Mother stated that the pt will be coming home from school on Nov 17th and will need to be seen by NP. MA offered an open slot for Dec 5th which is the earliest appt available, but the mother stated that the pt will be back in [place] on Dec 4th. Mother feels that this is urgent due to the pt needing his RX for Vyvanse. Informed mother that the pt will be added to our cancellation list and will be prompted if any open slots come available. No further concerns.

## 2017-11-14 ENCOUNTER — TELEPHONE (OUTPATIENT)
Dept: BEHAVIORAL/MENTAL HEALTH CLINIC | Age: 22
End: 2017-11-14

## 2017-11-14 NOTE — TELEPHONE ENCOUNTER
Pt's mother called office and left a vm stating that PCP will write a RX for Vyvanse just to hold pt over until appt with NP on Dec. 5th. In order for PCP to write this RX, he must have proof that NP is the prescribing doctor for this med. Can send last AVS to PCP. Pt's mother has the info to PCP office. Please advise.

## 2017-11-20 ENCOUNTER — DOCUMENTATION ONLY (OUTPATIENT)
Dept: BEHAVIORAL/MENTAL HEALTH CLINIC | Age: 22
End: 2017-11-20

## 2017-11-20 ENCOUNTER — OFFICE VISIT (OUTPATIENT)
Dept: BEHAVIORAL/MENTAL HEALTH CLINIC | Age: 22
End: 2017-11-20

## 2017-11-20 VITALS
BODY MASS INDEX: 28.61 KG/M2 | OXYGEN SATURATION: 96 % | SYSTOLIC BLOOD PRESSURE: 139 MMHG | DIASTOLIC BLOOD PRESSURE: 76 MMHG | HEART RATE: 104 BPM | WEIGHT: 178 LBS | HEIGHT: 66 IN

## 2017-11-20 DIAGNOSIS — F90.9 ATTENTION DEFICIT HYPERACTIVITY DISORDER (ADHD), UNSPECIFIED ADHD TYPE: Primary | ICD-10-CM

## 2017-11-20 DIAGNOSIS — F81.9 LEARNING DISABILITY: ICD-10-CM

## 2017-11-20 RX ORDER — TRAZODONE HYDROCHLORIDE 50 MG/1
TABLET ORAL
Qty: 30 TAB | Refills: 3 | Status: SHIPPED | OUTPATIENT
Start: 2017-11-20 | End: 2018-04-30 | Stop reason: SDUPTHER

## 2017-11-20 RX ORDER — GUANFACINE 4 MG/1
4 TABLET, EXTENDED RELEASE ORAL DAILY
Qty: 30 TAB | Refills: 3 | Status: SHIPPED | OUTPATIENT
Start: 2017-11-20

## 2017-11-20 RX ORDER — TRAZODONE HYDROCHLORIDE 50 MG/1
TABLET ORAL
Qty: 30 TAB | Refills: 0 | Status: SHIPPED | OUTPATIENT
Start: 2017-11-20 | End: 2017-11-20 | Stop reason: SDUPTHER

## 2017-11-20 RX ORDER — LISDEXAMFETAMINE DIMESYLATE 60 MG/1
60 CAPSULE ORAL DAILY
Qty: 30 CAP | Refills: 0 | Status: SHIPPED | OUTPATIENT
Start: 2018-01-18 | End: 2018-04-30 | Stop reason: SDUPTHER

## 2017-11-20 RX ORDER — LISDEXAMFETAMINE DIMESYLATE 60 MG/1
60 CAPSULE ORAL DAILY
Qty: 30 CAP | Refills: 0 | Status: SHIPPED | OUTPATIENT
Start: 2017-11-20 | End: 2017-11-20 | Stop reason: SDUPTHER

## 2017-11-20 RX ORDER — GUANFACINE 4 MG/1
4 TABLET, EXTENDED RELEASE ORAL DAILY
Qty: 30 TAB | Refills: 0 | Status: SHIPPED | OUTPATIENT
Start: 2017-11-20 | End: 2017-11-20 | Stop reason: SDUPTHER

## 2017-11-20 RX ORDER — LISDEXAMFETAMINE DIMESYLATE 60 MG/1
60 CAPSULE ORAL DAILY
Qty: 30 CAP | Refills: 0 | Status: SHIPPED | OUTPATIENT
Start: 2017-12-18 | End: 2017-11-20 | Stop reason: SDUPTHER

## 2017-11-20 NOTE — PROGRESS NOTES
CHIEF COMPLAINT:  Satish Stoner is a 25 y.o. male and was seen today for follow-up of psychiatric condition and psychotropic medication management. Last office visit was July 2017. He attends the session with his mother. HPI:      Satish Stoner is a 25 y.o. yo male who presents with symptoms of ADHD and anger. He was adopted at [address] [place] [address] after spending 5 years in the foster care system. He reports no PMH. He reports a long history of outpt psychiatric treatment for anger and ADHD starting in his childhood. He was in [facility] as a child due to his anger, and he was in a group home from [age]yo, but was kicked out after jumping up and down on the furniture. He was most recently with Dr. Eveline Moscoso and therapist Crow Jackson at [facility] where he was being treated for ADHD, but he graduated out of their adolescent program and so is looking for a provider who treats adults. Hi smother report a history of anger and throwing and breaking items. He has never been in physical altercations. Nikita reportedly has a learning disorder and graduated from a special school, [employer], with a modified high school diploma. He spends a large amount of his time playing video games. His mother reports that he has broken kathy consoles in the past in order to get the latest edition. They both remark that, with therapy and the ADHD medication that he has been on since 4th grade, his anger has improved significantly. Qian Sanchez has a history of impulsivity, hyperactivity, being overly talkative, restlessness, distractibility, and daydreaming. He has gotten distracted at school and wondered off in the past. His mother once got a call to come and pick him up from school as he hadn't taken his medication and was jumping around on the furniture and scratching his arms until they bled. He denies depression and SI/HI. He denies recent or remote anxiety or panic attacks. No psychosis.  He reports that this program will teach him job skills and independence.      FAMILY/SOCIAL HX: resides with his parents and a brother's GF and her young daughter, his brothers live next door, unemployed, graduated from [employer] with special education and a modified diploma, has a GF who resides in [facility]:  Psychiatric:  normal  Appetite:no change from normal and weight decrease by 11 lbs. Sleep: no change   Neuro: none reported  WILLIE: none    Visit Vitals    /76 (BP 1 Location: Left arm, BP Patient Position: Sitting)    Pulse (!) 104    Ht 5' 6\" (1.676 m)    Wt 80.7 kg (178 lb)    SpO2 96%    BMI 28.73 kg/m2       SCALES: PHQ-9 score in January 2017 was 5/27, indicating mild depression. Side Effects:  none    MENTAL STATUS EXAM:   Sensorium  oriented to time, place and person   Relations cooperative   Appearance:  age appropriate and casually dressed   Motor Behavior:  restless, gait stable and within normal limits   Speech:  normal pitch and normal volume   Thought Process: tangential   Thought Content free of delusions, free of hallucinations and not internally preoccupied    Suicidal ideations none   Homicidal ideations none   Mood:  euthymic   Affect:  euthymic   Memory recent  adequate   Memory remote:  adequate   Concentration:  fair   Abstraction:  concrete   Insight:  fair   Reliability fair   Judgment:  fair     MEDICAL DECISION MAKING:  Problems addressed today:    ICD-10-CM ICD-9-CM    1. Attention deficit hyperactivity disorder (ADHD), unspecified ADHD type F90.9 314.01    2. Learning disability F81.9 315.2        Assessment:   Nikita is responding to treatment, symptoms are stable. Patient reports that there are no changes to his medical conditions. He is back home from [place] for 1 week. He is happy at the program in [place] and has met friends and has a girlfriend, Jose Banerjee. He has done well there with the structure.  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 He is proud of his accomplishments. He reports that he will be in the program for at least another year. No provider there was ever arranged for him and he has been without his Vyvanse and Intuniv for 2 weeks. He reports that his teachers noticed that he was having more problems focusing and sustaining attention. His mother reports that she has tried calling his new counselor Rob Chin to make Nikita an appt with their in house psychiatrist, but she has not heard back from him yet. Current Outpatient Prescriptions   Medication Sig Dispense Refill    guanFACINE ER (INTUNIV) 4 mg Tb24 ER tablet Take 1 Tab by mouth daily. 30 Tab 3    traZODone (DESYREL) 50 mg tablet Take 1/2 to 1 whole tablet at bedtime for insomnia. 30 Tab 3    [START ON 1/18/2018] VYVANSE 60 mg capsule Take 1 Cap (60 mg total) by mouth dailyEarliest Fill Date: 1/18/18. Max Daily Amount: 60 mg 30 Cap 0       Plan:   1. Medications/ Labs: Continue Vyvanse 60mg qam for focus and attention and impulsivity ( 3 rxs provided: 11/20/17, 12/18/17, 1/18/18). Continue Intuniv ER 4mg qam for ADHD symptoms. Continue Trazodone 25-50mg qhs for insomnia. A prescription for one month of his medications was handed to his mother so that he can have these filled here. Another rx for 4 months of Intuniv and Trazodone was sent to [facility]. Nikita's mother will call back with the phone # for Rob Chin- will call him to help facilitate Nikita to their local psychiatrist so that he does not have a lapse in his medications. 2.  Counseling and coordination of care including instructions for treatment, risks/benefits, risk factor reduction and patient/family education. He agrees with the plan. Patient instructed to call with any side effects, questions or issues. 3.  Follow-up Disposition:  Return in about 3 months (around 2/20/2018).     11/20/2017  Sancho Guzman, NP

## 2017-11-20 NOTE — MR AVS SNAPSHOT
Visit Information Date & Time Provider Department Dept. Phone Encounter #  
 11/20/2017 10:30 AM Johanna Jarvis NP [facility]  Upcoming Health Maintenance Date Due  
 HPV AGE 9Y-34Y (1 of 3 - Male 3 Dose Series) 10/15/2007 Influenza Age 5 to Adult 8/1/2017 DTaP/Tdap/Td series (1 - Tdap) 10/15/2017 Allergies as of 11/20/2017  Review Complete On: 11/20/2017 By: Benita Ely CNA Severity Noted Reaction Type Reactions Concerta [Methylphenidate]  01/03/2017    Hives Pcn [Penicillins]  11/10/2014    Other (comments) Unknown. Patient is adopted and allergy was listed in record Current Immunizations  Never Reviewed No immunizations on file. Not reviewed this visit You Were Diagnosed With   
  
 Codes Comments Attention deficit hyperactivity disorder (ADHD), unspecified ADHD type    -  Primary ICD-10-CM: F90.9 ICD-9-CM: 314.01 Learning disability     ICD-10-CM: F81.9 ICD-9-CM: 315.2 Vitals BP Pulse Height(growth percentile) Weight(growth percentile) SpO2 BMI  
 139/76 (BP 1 Location: Left arm, BP Patient Position: Sitting) (!) 104 5' 6\" (1.676 m) 178 lb (80.7 kg) 96% 28.73 kg/m2 Smoking Status Never Smoker Vitals History BMI and BSA Data Body Mass Index Body Surface Area 28.73 kg/m 2 1.94 m 2 Preferred Pharmacy Pharmacy Name Phone [address] [place] [address] [phone] Your Updated Medication List  
  
   
This list is accurate as of: 11/20/17 10:56 AM.  Always use your most recent med list.  
  
  
  
  
 guanFACINE ER 4 mg Tb24 ER tablet Commonly known as: Intuniv Take 1 Tab by mouth daily. traZODone 50 mg tablet Commonly known as:  Leonidas James Take 1/2 to 1 whole tablet at bedtime for insomnia. VYVANSE 60 mg capsule Generic drug:  Lisdexamfetamine Take 1 Cap (60 mg total) by mouth dailyEarliest Fill Date: 1/18/18. Max Daily Amount: 60 mg  
Start taking on:  1/18/2018 Prescriptions Printed Refills VYVANSE 60 mg capsule 0 Starting on: 1/18/2018 Sig: Take 1 Cap (60 mg total) by mouth dailyEarliest Fill Date: 1/18/18. Max Daily Amount: 60 mg  
 Class: Print Route: Oral  
  
Prescriptions Sent to Pharmacy Refills  
 guanFACINE ER (INTUNIV) 4 mg Tb24 ER tablet 3 Sig: Take 1 Tab by mouth daily. Class: Normal  
 Pharmacy: [address] [place] [address] Ph #: [phone] Route: Oral  
 traZODone (DESYREL) 50 mg tablet 3 Sig: Take 1/2 to 1 whole tablet at bedtime for insomnia. Class: Normal  
 Pharmacy: [address] [place] [address] Ph #: [phone] Introducing \[facility]\"" & HEALTH SERVICES! Gladis Aldair introduces [employer] patient portal. Now you can access parts of your medical record, email your doctor's office, and request medication refills online. 1. In your internet browser, go to [email or web address] 2. Click on the First Time User? Click Here link in the Sign In box. You will see the New Member Sign Up page. 3. Enter your [employer] Access Code exactly as it appears below. You will not need to use this code after youve completed the sign-up process. If you do not sign up before the expiration date, you must request a new code. · [employer] Access Code: [email or web address] Expires: 2/12/2018  9:12 AM 
 
4. Enter the last four digits of your Social Security Number (xxxx) and Date of Birth (mm/dd/yyyy) as indicated and click Submit. You will be taken to the next sign-up page. 5. Create a [employer] Partner[employer] ID. This will be your [employer] login ID and cannot be changed, so think of one that is secure and easy to remember. 6. Create a [employer] password. You can change your password at any time. 7. Enter your Password Reset Question and Answer. This can be used at a later time if you forget your password. 8. Enter your e-mail address. You will receive e-mail notification when new information is available in [address] 9. Click Sign Up. You can now view and download portions of your medical record. 10. Click the Download Summary menu link to download a portable copy of your medical information. If you have questions, please visit the Frequently Asked Questions section of the SunLink website. Remember, SunLink is NOT to be used for urgent needs. For medical emergencies, dial 911. Now available from your iPhone and Android! Please provide this summary of care documentation to your next provider. Your primary care clinician is listed as Kiara Zapata. If you have any questions after today's visit, please call [phone].

## 2017-11-20 NOTE — PROGRESS NOTES
Patient's mother called with info for his counselor at [facility]: Daniel Murphy (#[phone]). Left him a voicemail asking for Nikita's POC for future med management visits while he is in [place].

## 2017-12-20 RX ORDER — GUANFACINE 4 MG/1
TABLET, EXTENDED RELEASE ORAL
Qty: 30 TAB | Refills: 0 | OUTPATIENT
Start: 2017-12-20

## 2018-04-30 ENCOUNTER — OFFICE VISIT (OUTPATIENT)
Dept: BEHAVIORAL/MENTAL HEALTH CLINIC | Age: 23
End: 2018-04-30

## 2018-04-30 VITALS
WEIGHT: 169 LBS | DIASTOLIC BLOOD PRESSURE: 58 MMHG | BODY MASS INDEX: 27.16 KG/M2 | HEART RATE: 62 BPM | SYSTOLIC BLOOD PRESSURE: 125 MMHG | HEIGHT: 66 IN

## 2018-04-30 DIAGNOSIS — F90.9 ATTENTION DEFICIT HYPERACTIVITY DISORDER (ADHD), UNSPECIFIED ADHD TYPE: Primary | ICD-10-CM

## 2018-04-30 DIAGNOSIS — F81.9 LEARNING DISABILITY: ICD-10-CM

## 2018-04-30 RX ORDER — LISDEXAMFETAMINE DIMESYLATE 60 MG/1
60 CAPSULE ORAL DAILY
Qty: 30 CAP | Refills: 0 | Status: SHIPPED | OUTPATIENT
Start: 2018-04-30

## 2018-04-30 RX ORDER — TRAZODONE HYDROCHLORIDE 50 MG/1
TABLET ORAL
Qty: 30 TAB | Refills: 2 | Status: SHIPPED | OUTPATIENT
Start: 2018-04-30

## 2018-04-30 NOTE — PROGRESS NOTES
CHIEF COMPLAINT:  Karyna Plascencia is a 25 y.o. male and was seen today for follow-up of psychiatric condition and psychotropic medication management. Last office visit was Nov 2017. He attends the session by himself while brother sits in waiting area. HPI:      Karyna Plascencia is a 25 y.o. male who presents with symptoms of ADHD and anger. He was adopted at [address] [place] [address] after spending 5 years in the foster care system. He reports no PMH. He reports a long history of outpt psychiatric treatment for anger and ADHD starting in his childhood. He was in [facility] as a child due to his anger, and he was in a group home from [age]yo, but was kicked out after jumping up and down on the furniture. He was most recently with Dr. Esperanza Nolasco and therapist Katie Carroll at [address] [place] [address] where he was being treated for ADHD, but he graduated out of their adolescent program and so is looking for a provider who treats adults. Toan schmidther report a history of anger and throwing and breaking items. He has never been in physical altercations. Nikita reportedly has a learning disorder and graduated from a special school, [employer] with a modified high school diploma. He spends a large amount of his time playing video games. His mother reports that he has broken kathy consoles in the past in order to get the latest edition. They both remark that, with therapy and the ADHD medication that he has been on since 4th grade, his anger has improved significantly. Masha Velez has a history of impulsivity, hyperactivity, being overly talkative, restlessness, distractibility, and daydreaming. He has gotten distracted at school and wondered off in the past. His mother once got a call to come and pick him up from school as he hadn't taken his medication and was jumping around on the furniture and scratching his arms until they bled. He denies depression and SI/HI. He denies recent or remote anxiety or panic attacks. No psychosis.  He reports that this program will teach him job skills and independence.      FAMILY/SOCIAL HX: resides with his parents and brother's GF and her young daughter, his brothers live next door, unemployed, graduated from [employer] with special education and a modified diploma, has a GF    REVIEW OF SYSTEMS:  Psychiatric:  normal  Appetite:good and weight decrease by 9lbs   Sleep: no change   Neuro: none reported  WILLIE: none    Visit Vitals    /58    Pulse 62    Ht 5' 6\" (1.676 m)    Wt 76.7 kg (169 lb)    BMI 27.28 kg/m2       SCALES: PHQ-9 score in January 2017 was 5/27, indicating mild depression. Side Effects:  none    MENTAL STATUS EXAM:   Sensorium  oriented to time, place and person   Relations cooperative   Appearance:  age appropriate and casually dressed   Motor Behavior:  restless, gait stable and within normal limits   Speech:  normal pitch and normal volume   Thought Process: tangential   Thought Content free of delusions, free of hallucinations and not internally preoccupied    Suicidal ideations none   Homicidal ideations none   Mood:  euthymic   Affect:  euthymic   Memory recent  adequate   Memory remote:  adequate   Concentration:  fair   Abstraction:  concrete   Insight:  fair   Reliability fair   Judgment:  fair     MEDICAL DECISION MAKING:  Problems addressed today:    ICD-10-CM ICD-9-CM    1. Attention deficit hyperactivity disorder (ADHD), unspecified ADHD type F90.9 314.01 VYVANSE 60 mg capsule   2. Learning disability F81.9 315.2        Assessment:   iNkita is responding to treatment, symptoms are stable. Patient reports that there are no changes to his medical conditions. He is back home from [employer] for 2 weeks because he got suspended for \"slacking off and bullying people. \" [employer] denies the charges. He is unhappy being home and cites conflicts with his mother. He is looking forward to going back to school on 5/5. He broke up with Deni Penny and is dating a new girl whom he says is a medium.  He is now working in [employer] and enjoys this. He is connected with Dr. Ángel Kim in [place] who has continued his med regimen.  reviewed and Vycanse last filled 3/29. He is not taking any pain medications or benzos. He reports needing a rx for Vyvnase and Trazodone as he won't see Dr. Mejia Hutson for several more weeks. Medications are helpful. Current Outpatient Prescriptions   Medication Sig Dispense Refill    traZODone (DESYREL) 50 mg tablet Take 1/2 to 1 whole tablet at bedtime for insomnia. 30 Tab 2    VYVANSE 60 mg capsule Take 1 Cap (60 mg total) by mouth daily. Max Daily Amount: 60 mg 30 Cap 0    guanFACINE ER (INTUNIV) 4 mg Tb24 ER tablet Take 1 Tab by mouth daily. 30 Tab 3       Plan:   1. Medications/ Labs: Continue Vyvanse 60mg qam for focus and attention and impulsivity (1 rx provided with today's date). Continue Intuniv ER 4mg qam for ADHD symptoms (no rx needed per patient). Continue Trazodone 25-50mg qhs for insomnia (rx provided). 2.  Counseling and coordination of care including instructions for treatment, risks/benefits, risk factor reduction and patient/family education. He agrees with the plan. Patient instructed to call with any side effects, questions or issues.      3.  Follow-up Disposition: Not on File    4/30/2018  Riky Chin NP

## 2018-04-30 NOTE — MR AVS SNAPSHOT
[address] [place] [address] 
[phone] Patient: Jackie Leach MRN: [id number] :1995 Visit Information Date & Time Provider Department Dept. Phone Encounter #  
 2018  9:30 AM Allison [facility] NP [facility] [phone] Upcoming Health Maintenance Date Due  
 HPV Age 9Y-34Y (3 of 1 - Male 3 Dose Series) 10/15/2007 DTaP/Tdap/Td series (1 - Tdap) 10/15/2017 Influenza Age 5 to Adult 2018 Allergies as of 2018  Review Complete On: 2018 By: Austyn España Severity Noted Reaction Type Reactions Concerta [Methylphenidate]  2017    Hives Pcn [Penicillins]  11/10/2014    Other (comments) Unknown. Patient is adopted and allergy was listed in record Current Immunizations  Never Reviewed No immunizations on file. Not reviewed this visit You Were Diagnosed With   
  
 Codes Comments Attention deficit hyperactivity disorder (ADHD), unspecified ADHD type    -  Primary ICD-10-CM: F90.9 ICD-9-CM: 314.01 Learning disability     ICD-10-CM: F81.9 ICD-9-CM: 315.2 Vitals BP Pulse Height(growth percentile) Weight(growth percentile) BMI Smoking Status 125/58 62 5' 6\" (1.676 m) 169 lb (76.7 kg) 27.28 kg/m2 Never Smoker Vitals History BMI and BSA Data Body Mass Index Body Surface Area  
 27.28 kg/m 2 1.89 m 2 Preferred Pharmacy Pharmacy Name Phone [id number] [place] [address] [place] [address] [phone] Your Updated Medication List  
  
   
This list is accurate as of 18 10:00 AM.  Always use your most recent med list.  
  
  
  
  
 guanFACINE ER 4 mg Tb24 ER tablet Commonly known as: Intuniv Take 1 Tab by mouth daily. traZODone 50 mg tablet Commonly known as:  [place] Take 1/2 to 1 whole tablet at bedtime for insomnia. VYVANSE 60 mg capsule Generic drug:  Lisdexamfetamine Take 1 Cap (60 mg total) by mouth daily. Max Daily Amount: 60 mg  
  
  
  
  
Prescriptions Printed Refills VYVANSE 60 mg capsule 0 Sig: Take 1 Cap (60 mg total) by mouth daily. Max Daily Amount: 60 mg  
 Class: Print Route: Oral  
  
Prescriptions Sent to Pharmacy Refills  
 traZODone (DESYREL) 50 mg tablet 2 Sig: Take 1/2 to 1 whole tablet at bedtime for insomnia. Class: Normal  
 Pharmacy: [id number], [address] [place] [address] #: [phone] Introducing [facility]! [employer] introduces [employer] patient portal. Now you can access parts of your medical record, email your doctor's office, and request medication refills online. 1. In your internet browser, go to [email or web address] 2. Click on the First Time User? Click Here link in the Sign In box. You will see the New Member Sign Up page. 3. Enter your [employer] Access Code exactly as it appears below. You will not need to use this code after youve completed the sign-up process. If you do not sign up before the expiration date, you must request a new code. · [employer] Access Code: [email or web address] Expires: 7/29/2018 10:00 AM 
 
4. Enter the last four digits of your Social Security Number (xxxx) and Date of Birth (mm/dd/yyyy) as indicated and click Submit. You will be taken to the next sign-up page. 5. Create a Boticcat ID. This will be your [employer] login ID and cannot be changed, so think of one that is secure and easy to remember. 6. Create a Boticcat password. You can change your password at any time. 7. Enter your Password Reset Question and Answer. This can be used at a later time if you forget your password. 8. Enter your e-mail address. You will receive e-mail notification when new information is available in [address] 9. Click Sign Up. You can now view and download portions of your medical record. 10. Click the Download Summary menu link to download a portable copy of your medical information. If you have questions, please visit the Frequently Asked Questions section of the [employer] website. Remember, [employer] is NOT to be used for urgent needs. For medical emergencies, dial 911. Now available from your iPhone and Android! Please provide this summary of care documentation to your next provider. Your primary care clinician is listed as Kiara Zapata. If you have any questions after today's visit, please call [phone].